# Patient Record
Sex: FEMALE | Race: WHITE | URBAN - METROPOLITAN AREA
[De-identification: names, ages, dates, MRNs, and addresses within clinical notes are randomized per-mention and may not be internally consistent; named-entity substitution may affect disease eponyms.]

---

## 2023-04-13 ENCOUNTER — OFFICE VISIT (OUTPATIENT)
Dept: URBAN - METROPOLITAN AREA CLINIC 8 | Facility: CLINIC | Age: 53
End: 2023-04-13
Payer: COMMERCIAL

## 2023-04-13 DIAGNOSIS — H02.811 RETAINED FOREIGN BODY IN RIGHT UPPER EYELID: Primary | ICD-10-CM

## 2023-04-13 PROCEDURE — 99203 OFFICE O/P NEW LOW 30 MIN: CPT | Performed by: OPHTHALMOLOGY

## 2023-04-13 ASSESSMENT — INTRAOCULAR PRESSURE
OS: 14
OD: 12

## 2023-04-13 NOTE — IMPRESSION/PLAN
Impression: Retained foreign body in right upper eyelid: H02.811. Plan: ingrown upper lid eyelash central. pt wants it removed. risks discussed. iodine prep. less than 0.5 cc lidocaine. removed w huy franco at [facility] ([facility] no problems. emycin ointment fu prn. Patient signed consent form.